# Patient Record
Sex: FEMALE | Race: WHITE
[De-identification: names, ages, dates, MRNs, and addresses within clinical notes are randomized per-mention and may not be internally consistent; named-entity substitution may affect disease eponyms.]

---
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
## 2018-07-16 ENCOUNTER — OFFICE VISIT (OUTPATIENT)
Dept: URGENT CARE | Facility: URGENT CARE | Age: 18
End: 2018-07-16
Payer: COMMERCIAL

## 2018-07-16 VITALS
OXYGEN SATURATION: 100 % | SYSTOLIC BLOOD PRESSURE: 102 MMHG | WEIGHT: 112 LBS | TEMPERATURE: 96.5 F | HEART RATE: 58 BPM | DIASTOLIC BLOOD PRESSURE: 64 MMHG

## 2018-07-16 DIAGNOSIS — B27.90 MONOSPOT TEST POSITIVE: ICD-10-CM

## 2018-07-16 DIAGNOSIS — R21 RASH AND NONSPECIFIC SKIN ERUPTION: Primary | ICD-10-CM

## 2018-07-16 LAB — HETEROPH AB SER QL: POSITIVE

## 2018-07-16 PROCEDURE — 36415 COLL VENOUS BLD VENIPUNCTURE: CPT | Performed by: INTERNAL MEDICINE

## 2018-07-16 PROCEDURE — 86665 EPSTEIN-BARR CAPSID VCA: CPT | Performed by: INTERNAL MEDICINE

## 2018-07-16 PROCEDURE — 86665 EPSTEIN-BARR CAPSID VCA: CPT | Mod: 59 | Performed by: INTERNAL MEDICINE

## 2018-07-16 PROCEDURE — 86664 EPSTEIN-BARR NUCLEAR ANTIGEN: CPT | Performed by: INTERNAL MEDICINE

## 2018-07-16 PROCEDURE — 99203 OFFICE O/P NEW LOW 30 MIN: CPT | Performed by: INTERNAL MEDICINE

## 2018-07-16 PROCEDURE — 86308 HETEROPHILE ANTIBODY SCREEN: CPT | Performed by: INTERNAL MEDICINE

## 2018-07-16 ASSESSMENT — ENCOUNTER SYMPTOMS
NAUSEA: 0
RHINORRHEA: 0
COUGH: 0
FATIGUE: 1

## 2018-07-16 NOTE — MR AVS SNAPSHOT
After Visit Summary   7/16/2018    Elizabeth Wilkins    MRN: [id number]           Patient Information     Date Of Birth          2000        Visit Information        Provider Department      7/16/2018 5:40 PM Chela Cárdenas MD [facility]        Today's Diagnoses     Rash and nonspecific skin eruption    -  1    Monospot test positive          Care Instructions      Monospot test is positive.  This potentially could be from past infection.  Due to your significant symptoms only being rash with slight sore throat,  I am going to do further antibody testing for mono.  Usually I would expect more prominent lymph nodes and exudative tonsillitis with mononucleosis in addition to high fevers.      If other lab tests suggest mononucleosis I would like you to recheck with your primary to monitor for liver and spleen involvement as I do not feel either on exam today    Please monitor fevers and other symptoms and recheck as needed.  Handout below discussed mononucleosis.          Mononucleosis  Mononucleosis (also called mono) is a contagious viral infection. Most infants and children exposed to the virus get only mild flu-like symptoms or no symptoms at all. However, infection is usually more serious in teens and young adults. While the virus is active it causes symptoms and can spread to others. After symptoms subside, the virus stays in the body and eventually becomes inactive. Once you have one case of mono, you are unlikely to develop symptoms again.  The virus is usually spread by contact with saliva, often by kissing. It may also spread by breastmilk, blood, or sexual contact. It takes about 4 to 6 weeks to develop symptoms after exposure.  Early symptoms include headache, nausea, tiredness and general muscle aching. This is followed by sore throat and fever. Lymph glands in the neck, under the arms, or in the groin may be swollen. Symptoms usually go away in  about 1 to 2 months. But they can last up to four months.  If symptoms have been present less than 1 week or more than 3 weeks, the blood test used to diagnose this disease may be negative even though you have the illness.  In this case, other tests may be done.  Taking the antibiotics ampicillin or amoxicillin during a mono infection may cause a skin rash. This is not serious and will fade in about one week. The cause is a reaction of the drug with the virus.  Mono can cause your spleen to swell. The spleen is a fist-sized organ in the upper left abdomen that stores red blood cells. Injury to a swollen spleen can cause the spleen to rupture. This can cause life-threatening internal bleeding. To avoid this, do not play contact sports or perform strenuous activity for 8 weeks, or until cleared by your healthcare provider. A sharp blow could rupture a swollen spleen  Home care    Rest in bed until the fever and weakness have gone away.    Drink plenty of fluids, but avoid alcohol. Otherwise, you may eat a regular diet.    Ask your healthcare provider about using over-the-counter medicines to treat symptoms such as fever, pain, or an itchy rash.    Over-the-counter throat lozenges may help soothe a sore throat. Gargling with warm salt water (1/2 teaspoon in 1 glass of warm water) may also be soothing to the throat.    You may return to work or school after the fever goes away and you are feeling better. Continue to follow any activity restrictions you have been given.  Preventing spread of the virus  To limit the spread of the virus, avoid exposing others to your saliva for at least 6 months after your illness (no kissing or sharing utensils, drinking glasses, or toothbrushes).  Follow-up care  Follow up with your healthcare provider within 1 to 2 weeks or as advised by our staff to be sure that there are no complications. If symptoms of extreme fatigue and swollen glands last longer than 6 months, see your healthcare  "provider for further testing.  When to seek medical advice  Call your healthcare provider right away if any of the following occur:    Excessive coughing    Yellow skin or eyes    Trouble swallowing  Call 911  Call 911 if any of the following occur:    Severe or worsening abdominal pain    Trouble breathing  Date Last Reviewed: 9/25/2015 2000-2017 [employer]. [address] [place] [address]. All rights reserved. This information is not intended as a substitute for professional medical care. Always follow your healthcare professional's instructions.                Follow-ups after your visit        Who to contact     If you have questions or need follow up information about today's clinic visit or your schedule please contact [facility] directly at [phone].  Normal or non-critical lab and imaging results will be communicated to you by Gone!hart, letter or phone within 4 business days after the clinic has received the results. If you do not hear from us within 7 days, please contact the clinic through Gone!hart or phone. If you have a critical or abnormal lab result, we will notify you by phone as soon as possible.  Submit refill requests through Playcez or call your pharmacy and they will forward the refill request to us. Please allow 3 business days for your refill to be completed.          Additional Information About Your Visit        Playcez Information     Playcez lets you send messages to your doctor, view your test results, renew your prescriptions, schedule appointments and more. To sign up, go to [email or web address] . Click on \"Log in\" on the left side of the screen, which will take you to the Welcome page. Then click on \"Sign up Now\" on the right side of the page.     You will be asked to enter the access code listed below, as well as some personal information. Please follow the directions to create your username and password.     Your access code " is: [email or web address]  Expires: 10/14/2018  6:52 PM     Your access code will  in 90 days. If you need help or a new code, please call your [place] clinic or [phone].        Care EveryWhere ID     This is your Care EveryWhere ID. This could be used by other organizations to access your [place] medical records  [id number]        Your Vitals Were     Pulse Temperature Pulse Oximetry             58 96.5  F (35.8  C) (Tympanic) 100%          Blood Pressure from Last 3 Encounters:   18 102/64    Weight from Last 3 Encounters:   18 112 lb (50.8 kg) (19 %)*     * Growth percentiles are based on [facility] 2-20 Years data.              We Performed the Following     EBV Capsid Antibody IgG     EBV Capsid Antibody IgM     EBV Nuclear Antigen EBNA Antibody IgG     Mononucleosis screen        Primary Care Provider Fax #    Physician No Ref-Primary [phone]       No address on file        Equal Access to Services     LATONIA TEJADA : Hadii aad ku hadasho Sotaniaali, waaxda luqadaha, qaybta kaalmada adeegyada, waxay richardin hayfélixn montserrat rodriguez . So [facility] [phone].    ATENCIÓN: Si habla español, tiene a wade disposición servicios gratuitos de asistencia lingüística. Llame al [phone].    We comply with applicable federal civil rights laws and [place] laws. We do not discriminate on the basis of race, color, national origin, age, disability, sex, sexual orientation, or gender identity.            Thank you!     Thank you for choosing [facility]  for your care. Our goal is always to provide you with excellent care. Hearing back from our patients is one way we can continue to improve our services. Please take a few minutes to complete the written survey that you may receive in the mail after your visit with us. Thank you!             Your Updated Medication List - Protect others around you: Learn how to safely use, store and throw away your medicines at www.disposemymeds.org.           This list is accurate as of 7/16/18  6:52 PM.  Always use your most recent med list.                   Brand Name Dispense Instructions for use Diagnosis    BENADRYL PO           PREDNISONE PO

## 2018-07-16 NOTE — PATIENT INSTRUCTIONS
Monospot test is positive.  This potentially could be from past infection.  Due to your significant symptoms only being rash with slight sore throat,  I am going to do further antibody testing for mono.  Usually I would expect more prominent lymph nodes and exudative tonsillitis with mononucleosis in addition to high fevers.      If other lab tests suggest mononucleosis I would like you to recheck with your primary to monitor for liver and spleen involvement as I do not feel either on exam today    Please monitor fevers and other symptoms and recheck as needed.  Handout below discussed mononucleosis.          Mononucleosis  Mononucleosis (also called mono) is a contagious viral infection. Most infants and children exposed to the virus get only mild flu-like symptoms or no symptoms at all. However, infection is usually more serious in teens and young adults. While the virus is active it causes symptoms and can spread to others. After symptoms subside, the virus stays in the body and eventually becomes inactive. Once you have one case of mono, you are unlikely to develop symptoms again.  The virus is usually spread by contact with saliva, often by kissing. It may also spread by breastmilk, blood, or sexual contact. It takes about 4 to 6 weeks to develop symptoms after exposure.  Early symptoms include headache, nausea, tiredness and general muscle aching. This is followed by sore throat and fever. Lymph glands in the neck, under the arms, or in the groin may be swollen. Symptoms usually go away in about 1 to 2 months. But they can last up to four months.  If symptoms have been present less than 1 week or more than 3 weeks, the blood test used to diagnose this disease may be negative even though you have the illness.  In this case, other tests may be done.  Taking the antibiotics ampicillin or amoxicillin during a mono infection may cause a skin rash. This is not serious and will fade in about one week. The cause is  a reaction of the drug with the virus.  Mono can cause your spleen to swell. The spleen is a fist-sized organ in the upper left abdomen that stores red blood cells. Injury to a swollen spleen can cause the spleen to rupture. This can cause life-threatening internal bleeding. To avoid this, do not play contact sports or perform strenuous activity for 8 weeks, or until cleared by your healthcare provider. A sharp blow could rupture a swollen spleen  Home care    Rest in bed until the fever and weakness have gone away.    Drink plenty of fluids, but avoid alcohol. Otherwise, you may eat a regular diet.    Ask your healthcare provider about using over-the-counter medicines to treat symptoms such as fever, pain, or an itchy rash.    Over-the-counter throat lozenges may help soothe a sore throat. Gargling with warm salt water (1/2 teaspoon in 1 glass of warm water) may also be soothing to the throat.    You may return to work or school after the fever goes away and you are feeling better. Continue to follow any activity restrictions you have been given.  Preventing spread of the virus  To limit the spread of the virus, avoid exposing others to your saliva for at least 6 months after your illness (no kissing or sharing utensils, drinking glasses, or toothbrushes).  Follow-up care  Follow up with your healthcare provider within 1 to 2 weeks or as advised by our staff to be sure that there are no complications. If symptoms of extreme fatigue and swollen glands last longer than 6 months, see your healthcare provider for further testing.  When to seek medical advice  Call your healthcare provider right away if any of the following occur:    Excessive coughing    Yellow skin or eyes    Trouble swallowing  Call 911  Call 911 if any of the following occur:    Severe or worsening abdominal pain    Trouble breathing  Date Last Reviewed: 9/25/2015 2000-2017 [employer]. [address] [place] [address], [place] [address]. All  rights reserved. This information is not intended as a substitute for professional medical care. Always follow your healthcare professional's instructions.

## 2018-07-16 NOTE — PROGRESS NOTES
SUBJECTIVE:   Elizabeth Wilkins is a 17 year old female presenting with a chief complaint of   Chief Complaint   Patient presents with     Urgent Care     Pt in clinic to have eval for body rash due to what she beleives is Amox or Mono     Derm Problem       She is a new patient of [facility].    Rash    Onset of rash was 4-5 day(s) ago.   Course of illness is improving.  Current and Associated symptoms: itching   Location of the rash: generalized.  [place]  Thought rash related to Amox  Mother wants  Her also to be checked for mono    Previous history of a similar rash? No  Recent exposure history: amox for wisdom teeth  Took for 7 days/10 days  Treatment measures tried include: benadryl, oral steroid        Review of Systems   Constitutional: Positive for fatigue.        Did not check for fever.  No fever at clinic visit   HENT: Negative for rhinorrhea.         Little ST   Respiratory: Negative for cough.    Gastrointestinal: Negative for nausea.   Neurological:        Felt floating with walking sometimes       Past Medical History:   Diagnosis Date     Asthma     as child     No family history on file.  Current Outpatient Prescriptions   Medication Sig Dispense Refill     DiphenhydrAMINE HCl (BENADRYL PO)        PREDNISONE PO        Social History   Substance Use Topics     Smoking status: Never Smoker     Smokeless tobacco: Never Used     Alcohol use Not on file       OBJECTIVE  /64  Pulse 58  Temp 96.5  F (35.8  C) (Tympanic)  Wt 112 lb (50.8 kg)  SpO2 100%    Physical Exam   Constitutional: She appears well-developed and well-nourished.   HENT:   Nose: Nose normal.   Mouth/Throat: No oropharyngeal exudate.   Tm nl B  Op pnd   Cardiovascular: Normal rate, regular rhythm and normal heart sounds.    Pulmonary/Chest: Effort normal and breath sounds normal.   Abdominal: Soft.   No HSM   Lymphadenopathy:     She has no cervical adenopathy.   Skin:    Generalized mac papular rash       Labs:    Results for orders placed or performed in visit on 07/16/18 (from the past 24 hour(s))   Mononucleosis screen   Result Value Ref Range    Mononucleosis Screen Positive (A) NEG^Negative           ASSESSMENT:      ICD-10-CM    1. Rash and nonspecific skin eruption R21 Mononucleosis screen     EBV Nuclear Antigen EBNA Antibody IgG     EBV Capsid Antibody IgM     EBV Capsid Antibody IgG   2. Monospot test positive B27.90         Medical Decision Making:    Differential Diagnosis:  Drug eruption  Viral examthem   Mono    Serious Comorbid Conditions:  Adult:  None    PLAN:    Fluids  rest    Followup:    If not improving or if condition worsens, follow up with your Primary Care Provider    Patient Instructions     Monospot test is positive.  This potentially could be from past infection.  Due to your significant symptoms only being rash with slight sore throat,  I am going to do further antibody testing for mono.  Usually I would expect more prominent lymph nodes and exudative tonsillitis with mononucleosis in addition to high fevers.      If other lab tests suggest mononucleosis I would like you to recheck with your primary to monitor for liver and spleen involvement as I do not feel either on exam today    Please monitor fevers and other symptoms and recheck as needed.  Handout below discussed mononucleosis.          Mononucleosis  Mononucleosis (also called mono) is a contagious viral infection. Most infants and children exposed to the virus get only mild flu-like symptoms or no symptoms at all. However, infection is usually more serious in teens and young adults. While the virus is active it causes symptoms and can spread to others. After symptoms subside, the virus stays in the body and eventually becomes inactive. Once you have one case of mono, you are unlikely to develop symptoms again.  The virus is usually spread by contact with saliva, often by kissing. It may also  spread by breastmilk, blood, or sexual contact. It takes about 4 to 6 weeks to develop symptoms after exposure.  Early symptoms include headache, nausea, tiredness and general muscle aching. This is followed by sore throat and fever. Lymph glands in the neck, under the arms, or in the groin may be swollen. Symptoms usually go away in about 1 to 2 months. But they can last up to four months.  If symptoms have been present less than 1 week or more than 3 weeks, the blood test used to diagnose this disease may be negative even though you have the illness.  In this case, other tests may be done.  Taking the antibiotics ampicillin or amoxicillin during a mono infection may cause a skin rash. This is not serious and will fade in about one week. The cause is a reaction of the drug with the virus.  Mono can cause your spleen to swell. The spleen is a fist-sized organ in the upper left abdomen that stores red blood cells. Injury to a swollen spleen can cause the spleen to rupture. This can cause life-threatening internal bleeding. To avoid this, do not play contact sports or perform strenuous activity for 8 weeks, or until cleared by your healthcare provider. A sharp blow could rupture a swollen spleen  Home care    Rest in bed until the fever and weakness have gone away.    Drink plenty of fluids, but avoid alcohol. Otherwise, you may eat a regular diet.    Ask your healthcare provider about using over-the-counter medicines to treat symptoms such as fever, pain, or an itchy rash.    Over-the-counter throat lozenges may help soothe a sore throat. Gargling with warm salt water (1/2 teaspoon in 1 glass of warm water) may also be soothing to the throat.    You may return to work or school after the fever goes away and you are feeling better. Continue to follow any activity restrictions you have been given.  Preventing spread of the virus  To limit the spread of the virus, avoid exposing others to your saliva for at least 6  months after your illness (no kissing or sharing utensils, drinking glasses, or toothbrushes).  Follow-up care  Follow up with your healthcare provider within 1 to 2 weeks or as advised by our staff to be sure that there are no complications. If symptoms of extreme fatigue and swollen glands last longer than 6 months, see your healthcare provider for further testing.  When to seek medical advice  Call your healthcare provider right away if any of the following occur:    Excessive coughing    Yellow skin or eyes    Trouble swallowing  Call 911  Call 911 if any of the following occur:    Severe or worsening abdominal pain    Trouble breathing  Date Last Reviewed: 9/25/2015 2000-2017 [employer]. [address] [place] [address], [place] [address]. All rights reserved. This information is not intended as a substitute for professional medical care. Always follow your healthcare professional's instructions.

## 2018-07-18 LAB
EBV NA IGG SER QL IA: <0.2 AI (ref 0–0.8)
EBV VCA IGG SER QL IA: <0.2 AI (ref 0–0.8)
EBV VCA IGM SER QL IA: <0.2 AI (ref 0–0.8)

## 2019-12-13 ENCOUNTER — COMMUNICATION - HEALTHEAST (OUTPATIENT)
Dept: FAMILY MEDICINE | Facility: CLINIC | Age: 19
End: 2019-12-13

## 2021-06-04 NOTE — TELEPHONE ENCOUNTER
New Appointment Needed  What is the reason for the visit:    Pre-Op Appt Request  When is the surgery? :  12/20/19  Where is the surgery?:   [facility]  Who is the surgeon? :  Dr. Sandhya Gooden  What type of surgery is being done?: Knee scope  Provider Preference: Any available  How soon do you need to be seen?: As soon as possible  Waitlist offered?: No  Okay to leave a detailed message:  Yes